# Patient Record
Sex: MALE | Race: WHITE | Employment: FULL TIME | URBAN - METROPOLITAN AREA
[De-identification: names, ages, dates, MRNs, and addresses within clinical notes are randomized per-mention and may not be internally consistent; named-entity substitution may affect disease eponyms.]

---

## 2018-02-25 ENCOUNTER — HOSPITAL ENCOUNTER (OUTPATIENT)
Age: 63
Setting detail: OBSERVATION
Discharge: HOME | End: 2018-02-26
Attending: EMERGENCY MEDICINE | Admitting: INTERNAL MEDICINE
Payer: COMMERCIAL

## 2018-02-25 ENCOUNTER — APPOINTMENT (OUTPATIENT)
Dept: CT IMAGING | Age: 63
End: 2018-02-25
Attending: EMERGENCY MEDICINE
Payer: COMMERCIAL

## 2018-02-25 DIAGNOSIS — G45.1 HEMISPHERIC CAROTID ARTERY SYNDROME: ICD-10-CM

## 2018-02-25 DIAGNOSIS — R20.0 NUMBNESS: Primary | ICD-10-CM

## 2018-02-25 DIAGNOSIS — R29.90 STROKE-LIKE SYMPTOMS: ICD-10-CM

## 2018-02-25 PROBLEM — G45.9 TIA (TRANSIENT ISCHEMIC ATTACK): Status: ACTIVE | Noted: 2018-02-25

## 2018-02-25 LAB
ALBUMIN SERPL-MCNC: 4.2 G/DL (ref 3.5–5)
ALBUMIN/GLOB SERPL: 1.3 {RATIO} (ref 1.1–2.2)
ALP SERPL-CCNC: 78 U/L (ref 45–117)
ALT SERPL-CCNC: 31 U/L (ref 12–78)
ANION GAP SERPL CALC-SCNC: 7 MMOL/L (ref 5–15)
APTT PPP: 27.6 SEC (ref 22.1–32)
AST SERPL-CCNC: 19 U/L (ref 15–37)
BASOPHILS # BLD: 0 K/UL (ref 0–0.1)
BASOPHILS NFR BLD: 0 % (ref 0–1)
BILIRUB SERPL-MCNC: 0.7 MG/DL (ref 0.2–1)
BUN SERPL-MCNC: 17 MG/DL (ref 6–20)
BUN/CREAT SERPL: 16 (ref 12–20)
CALCIUM SERPL-MCNC: 8.8 MG/DL (ref 8.5–10.1)
CHLORIDE SERPL-SCNC: 105 MMOL/L (ref 97–108)
CO2 SERPL-SCNC: 29 MMOL/L (ref 21–32)
CREAT SERPL-MCNC: 1.08 MG/DL (ref 0.7–1.3)
DIFFERENTIAL METHOD BLD: NORMAL
EOSINOPHIL # BLD: 0.2 K/UL (ref 0–0.4)
EOSINOPHIL NFR BLD: 3 % (ref 0–7)
ERYTHROCYTE [DISTWIDTH] IN BLOOD BY AUTOMATED COUNT: 12.5 % (ref 11.5–14.5)
GLOBULIN SER CALC-MCNC: 3.2 G/DL (ref 2–4)
GLUCOSE BLD STRIP.AUTO-MCNC: 125 MG/DL (ref 65–100)
GLUCOSE SERPL-MCNC: 89 MG/DL (ref 65–100)
HCT VFR BLD AUTO: 42.1 % (ref 36.6–50.3)
HGB BLD-MCNC: 14.5 G/DL (ref 12.1–17)
IMM GRANULOCYTES # BLD: 0 K/UL (ref 0–0.04)
IMM GRANULOCYTES NFR BLD AUTO: 0 % (ref 0–0.5)
INR BLD: 1.2 (ref 0.9–1.2)
INR PPP: 1.1 (ref 0.9–1.1)
LYMPHOCYTES # BLD: 1.3 K/UL (ref 0.8–3.5)
LYMPHOCYTES NFR BLD: 26 % (ref 12–49)
MCH RBC QN AUTO: 31.9 PG (ref 26–34)
MCHC RBC AUTO-ENTMCNC: 34.4 G/DL (ref 30–36.5)
MCV RBC AUTO: 92.7 FL (ref 80–99)
MONOCYTES # BLD: 0.6 K/UL (ref 0–1)
MONOCYTES NFR BLD: 11 % (ref 5–13)
NEUTS SEG # BLD: 3 K/UL (ref 1.8–8)
NEUTS SEG NFR BLD: 59 % (ref 32–75)
NRBC # BLD: 0 K/UL (ref 0–0.01)
NRBC BLD-RTO: 0 PER 100 WBC
PLATELET # BLD AUTO: 162 K/UL (ref 150–400)
PMV BLD AUTO: 9.2 FL (ref 8.9–12.9)
POTASSIUM SERPL-SCNC: 4.2 MMOL/L (ref 3.5–5.1)
PROT SERPL-MCNC: 7.4 G/DL (ref 6.4–8.2)
PROTHROMBIN TIME: 11.2 SEC (ref 9–11.1)
RBC # BLD AUTO: 4.54 M/UL (ref 4.1–5.7)
SERVICE CMNT-IMP: ABNORMAL
SODIUM SERPL-SCNC: 141 MMOL/L (ref 136–145)
THERAPEUTIC RANGE,PTTT: NORMAL SECS (ref 58–77)
WBC # BLD AUTO: 5.1 K/UL (ref 4.1–11.1)

## 2018-02-25 PROCEDURE — 85025 COMPLETE CBC W/AUTO DIFF WBC: CPT | Performed by: EMERGENCY MEDICINE

## 2018-02-25 PROCEDURE — 85730 THROMBOPLASTIN TIME PARTIAL: CPT | Performed by: EMERGENCY MEDICINE

## 2018-02-25 PROCEDURE — 70450 CT HEAD/BRAIN W/O DYE: CPT

## 2018-02-25 PROCEDURE — 96372 THER/PROPH/DIAG INJ SC/IM: CPT

## 2018-02-25 PROCEDURE — 74011250636 HC RX REV CODE- 250/636: Performed by: INTERNAL MEDICINE

## 2018-02-25 PROCEDURE — 85610 PROTHROMBIN TIME: CPT | Performed by: EMERGENCY MEDICINE

## 2018-02-25 PROCEDURE — 93005 ELECTROCARDIOGRAM TRACING: CPT

## 2018-02-25 PROCEDURE — 85610 PROTHROMBIN TIME: CPT

## 2018-02-25 PROCEDURE — 99218 HC RM OBSERVATION: CPT

## 2018-02-25 PROCEDURE — 80053 COMPREHEN METABOLIC PANEL: CPT | Performed by: EMERGENCY MEDICINE

## 2018-02-25 PROCEDURE — 36415 COLL VENOUS BLD VENIPUNCTURE: CPT | Performed by: EMERGENCY MEDICINE

## 2018-02-25 PROCEDURE — 99285 EMERGENCY DEPT VISIT HI MDM: CPT

## 2018-02-25 PROCEDURE — 74011250637 HC RX REV CODE- 250/637: Performed by: INTERNAL MEDICINE

## 2018-02-25 PROCEDURE — 82962 GLUCOSE BLOOD TEST: CPT

## 2018-02-25 RX ORDER — SERTRALINE HYDROCHLORIDE 50 MG/1
150 TABLET, FILM COATED ORAL DAILY
Status: DISCONTINUED | OUTPATIENT
Start: 2018-02-26 | End: 2018-02-26 | Stop reason: HOSPADM

## 2018-02-25 RX ORDER — ENOXAPARIN SODIUM 100 MG/ML
40 INJECTION SUBCUTANEOUS EVERY 24 HOURS
Status: DISCONTINUED | OUTPATIENT
Start: 2018-02-25 | End: 2018-02-26 | Stop reason: HOSPADM

## 2018-02-25 RX ORDER — ACETAMINOPHEN 325 MG/1
650 TABLET ORAL
Status: DISCONTINUED | OUTPATIENT
Start: 2018-02-25 | End: 2018-02-26 | Stop reason: HOSPADM

## 2018-02-25 RX ORDER — ZOLPIDEM TARTRATE 5 MG/1
5 TABLET ORAL
Status: DISCONTINUED | OUTPATIENT
Start: 2018-02-25 | End: 2018-02-26 | Stop reason: HOSPADM

## 2018-02-25 RX ORDER — GUAIFENESIN 100 MG/5ML
81 LIQUID (ML) ORAL DAILY
Status: DISCONTINUED | OUTPATIENT
Start: 2018-02-25 | End: 2018-02-26 | Stop reason: HOSPADM

## 2018-02-25 RX ORDER — ONDANSETRON 2 MG/ML
4 INJECTION INTRAMUSCULAR; INTRAVENOUS
Status: DISCONTINUED | OUTPATIENT
Start: 2018-02-25 | End: 2018-02-26 | Stop reason: HOSPADM

## 2018-02-25 RX ORDER — ATORVASTATIN CALCIUM 40 MG/1
40 TABLET, FILM COATED ORAL DAILY
Status: DISCONTINUED | OUTPATIENT
Start: 2018-02-26 | End: 2018-02-26 | Stop reason: HOSPADM

## 2018-02-25 RX ADMIN — ASPIRIN 81 MG 81 MG: 81 TABLET ORAL at 17:14

## 2018-02-25 RX ADMIN — ENOXAPARIN SODIUM 40 MG: 40 INJECTION SUBCUTANEOUS at 17:14

## 2018-02-25 NOTE — ED NOTES
This RN assumed care of patient from triage. Patient reports right sided numbness and dizziness began at 0945 and lasted about 15-20 minutes. Patient reports 2/10 right sided headache. Hand grasps equal, negative drift, clear and logical speech. Patient A&O, speaking in full sentences, and does not appear to be in distress at this time. Patient changed into gown and placed on monitor.

## 2018-02-25 NOTE — ED PROVIDER NOTES
HPI Comments: 65 y.o. male with past medical history significant for HTN, hypercholesterolemia, depression, and cancer who presents accompanied by his wife with chief complaint of numbness. The pt c/o a sudden onset of R arm numbness at 9:45 AM (approximately 1 hour ago). The pt reports that he also developed R leg discomfort that was different from his R arm numbness and a mild R sided HA. The pt also notes lightheadedness and dizziness. The pt denies previous episodes of similar symptoms. The pt denies weakness, decreased ROM, slurred speech, and difficulty ambulating. There are no other acute medical concerns at this time. Social hx: nonsmoker  PCP: Brian Arce MD    Note written by Uriel Zurita, as dictated by Rosana Collier MD 10:48 AM     The history is provided by the patient. No  was used. Past Medical History:   Diagnosis Date    Cancer ([facility] [address]     bcc    Depression 10/19/2011    HTN (hypertension) 10/19/2011    Pure hypercholesterolemia 2015       Past Surgical History:   Procedure Laterality Date    TOTAL HIP ARTHROPLASTY      right          Family History:   Problem Relation Age of Onset    Cancer Sister      lymphoma  36       Social History     Social History    Marital status:      Spouse name: N/A    Number of children: N/A    Years of education: N/A     Occupational History    Not on file. Social History Main Topics    Smoking status: Never Smoker    Smokeless tobacco: Former User    Alcohol use Yes    Drug use: Not on file    Sexual activity: Not on file     Other Topics Concern    Not on file     Social History Narrative         ALLERGIES: Review of patient's allergies indicates no known allergies. Review of Systems   Constitutional: Negative for chills and fever. HENT: Negative for rhinorrhea and sore throat. Respiratory: Negative for cough and shortness of breath. Cardiovascular: Negative for chest pain. Gastrointestinal: Negative for abdominal pain, diarrhea, nausea and vomiting. Genitourinary: Negative for dysuria and hematuria. Musculoskeletal: Positive for myalgias (R leg). Negative for arthralgias and gait problem. Skin: Negative for pallor and rash. Neurological: Positive for dizziness, light-headedness, numbness (R arm) and headaches (R sided). Negative for speech difficulty and weakness. Vitals:    02/25/18 1045 02/25/18 1117   BP: 134/87 135/84   Pulse: 67 62   Resp: 16 15   Temp: 98.2 °F (36.8 °C)    SpO2: 99%    Weight: 82.1 kg (181 lb)    Height: 5' 10\" (1.778 m)             Physical Exam   Vital signs reviewed. Nursing notes reviewed. Const:  No acute distress, well developed, well nourished  Head:  Atraumatic, normocephalic  Eyes:  PERRL, conjunctiva normal, no scleral icterus  Neck:  Supple, trachea midline  Cardiovascular:  RRR, no murmurs, no gallops, no rubs  Resp:  No resp distress, no increased work of breathing, no wheezes, no rhonchi, no rales,  Abd:  Soft, non-tender, non-distended, no rebound, no guarding, no CVA tenderness  :  Deferred  MSK:  No pedal edema, normal ROM  Neuro:  Alert and oriented x3, no cranial nerve defect. Equal BUE and BLE strength. Negative pronator drift. Normal finger to nose testing bilaterally. Skin:  Warm, dry, intact  Psych: normal mood and affect, behavior is normal, judgement and thought content is normal   Note written by Uriel Aguero, as dictated by Brayan Krishna MD 10:48 AM    MDM  Number of Diagnoses or Management Options  Numbness:   Stroke-like symptoms:      Amount and/or Complexity of Data Reviewed  Clinical lab tests: ordered and reviewed  Tests in the radiology section of CPT®: ordered and reviewed  Review and summarize past medical records: yes    Patient Progress  Patient progress: stable      ED Course     Pt. Presents to the ER with numbness. Pt. Is well appearing in the ER. Pt. Is is no distress in the ER.   No mass or bleed on head CT. Concern for TIA/stroke. Pt. To be evaluated for admission by the PCP. Procedures           CONSULT NOTE:  10:56 AM Elías Monroy MD spoke with Dr. Yanet Acuna, Consult for Tele-neurology. Discussed available diagnostic tests and clinical findings. Dr. Tiffany Brooks will see the pt. CONSULT NOTE:  11:05 AM Elías Monroy MD spoke with Dr. Yanet Acuna, Consult for Tele-neurology. Discussed available diagnostic tests and clinical findings. Dr. Tiffany Brooks recommends admittance for a TIA/stroke work up. CONSULT NOTE:  2:01 PM Elías Monroy MD spoke with Dr. Alison Carty, Consult for family practice. Discussed available diagnostic tests and clinical findings. He is in agreement with care plans as outlined. Dr. Alison Carty will see and admit pt for further evaluation of treatment. EKG interpretation: (Preliminary)  Rhythm: sinus bradycardia; and regular . Rate (approx.): 58; Axis: normal; P wave: normal; QRS interval: normal ; ST/T wave: normal; Other findings: borderline ekg.

## 2018-02-25 NOTE — PROGRESS NOTES
Admission Medication Reconciliation:    Information obtained from:  Patient, JOHANNA    Comments/Recommendations: Updated PTA meds/reviewed patient's allergies. Verified med dose using [id number]. Patient reported last administered doses were yesterday. Significant PMH/Disease States:   Past Medical History:   Diagnosis Date    Cancer ([facility] [address]     bcc    Depression 10/19/2011    HTN (hypertension) 10/19/2011    Pure hypercholesterolemia 1/29/2015       Chief Complaint for this Admission:    Chief Complaint   Patient presents with    Numbness    Dizziness       Allergies:  Review of patient's allergies indicates no known allergies. Prior to Admission Medications:   Prior to Admission Medications   Prescriptions Last Dose Informant Patient Reported? Taking?   atorvastatin (LIPITOR) 40 mg tablet 2/24/2018 at Unknown time  No Yes   Sig: TAKE 1 TABLET BY MOUTH DAILY. lisinopril-hydroCHLOROthiazide (PRINZIDE, ZESTORETIC) 20-12.5 mg per tablet 2/24/2018 at Unknown time  No Yes   Sig: TAKE 1 TABLET BY MOUTH DAILY. sertraline (ZOLOFT) 100 mg tablet 2/24/2018 at Unknown time  No Yes   Sig: TAKE 1 AND 1/2 TABLETS BY MOUTH EVERY MORNING.       Facility-Administered Medications: None

## 2018-02-25 NOTE — H&P
History and Physical    Subjective:     Tasia Serrano is a 65 y.o. white male with HTN & hyperlipidemia. He presents to the ER with R-sided numbness, involving his RUE & LUE. No other neurologic sx's. No CP/SOB. No f/c. No n/v. His sx's are essentially resolved now. He is otherwise healthy, and he is on Lipitor & Prinzide as below. In the ER, head CT is normal, and labs unremarkable. Pt is being placed on observation for suspected TIA. Past Medical History:   Diagnosis Date    Cancer ([facility] [address]     bcc    Depression 10/19/2011    HTN (hypertension) 10/19/2011    Pure hypercholesterolemia 2015     No Known Allergies  Prior to Admission medications    Medication Sig Start Date End Date Taking? Authorizing Provider   sertraline (ZOLOFT) 100 mg tablet TAKE 1 AND 1/2 TABLETS BY MOUTH EVERY MORNING. 17   LUDWIG Robb MD   atorvastatin (LIPITOR) 40 mg tablet TAKE 1 TABLET BY MOUTH DAILY. 17   Dominick Dance, MD   lisinopril-hydroCHLOROthiazide (PRINZIDE, ZESTORETIC) 20-12.5 mg per tablet TAKE 1 TABLET BY MOUTH DAILY. 3/15/17   Dominick Dance, MD     Social History   Substance Use Topics    Smoking status: Never Smoker    Smokeless tobacco: Former User    Alcohol use Yes     Family History   Problem Relation Age of Onset    Cancer Sister      lymphoma  36               Review of Systems:  As above. Objective:       Physical Exam:   In NAD. HEENT -- Cranial nerves intact, non-focal.  Neck -- Supple. No JVD. Heart -- RRR. No R/M/G. Lungs -- CTA. Abdomen -- Soft. Non-tender. Non-distended. No masses. Bowel sounds present. Extremeties -- No edema. Neuro -- Non-focal.  No gross neurologic deficits in terms of strength or sensation to light touch.         Data Review:   Recent Results (from the past 24 hour(s))   GLUCOSE, POC    Collection Time: 18 10:57 AM   Result Value Ref Range    Glucose (POC) 125 (H) 65 - 100 mg/dL    Performed by [employer]    POC INR    Collection Time: 02/25/18 10:59 AM   Result Value Ref Range    INR (POC) 1.2 (H) <1.2     CBC WITH AUTOMATED DIFF    Collection Time: 02/25/18 11:13 AM   Result Value Ref Range    WBC 5.1 4.1 - 11.1 K/uL    RBC 4.54 4.10 - 5.70 M/uL    HGB 14.5 12.1 - 17.0 g/dL    HCT 42.1 36.6 - 50.3 %    MCV 92.7 80.0 - 99.0 FL    MCH 31.9 26.0 - 34.0 PG    MCHC 34.4 30.0 - 36.5 g/dL    RDW 12.5 11.5 - 14.5 %    PLATELET 683 485 - 445 K/uL    MPV 9.2 8.9 - 12.9 FL    NRBC 0.0 0  WBC    ABSOLUTE NRBC 0.00 0.00 - 0.01 K/uL    NEUTROPHILS 59 32 - 75 %    LYMPHOCYTES 26 12 - 49 %    MONOCYTES 11 5 - 13 %    EOSINOPHILS 3 0 - 7 %    BASOPHILS 0 0 - 1 %    IMMATURE GRANULOCYTES 0 0.0 - 0.5 %    ABS. NEUTROPHILS 3.0 1.8 - 8.0 K/UL    ABS. LYMPHOCYTES 1.3 0.8 - 3.5 K/UL    ABS. MONOCYTES 0.6 0.0 - 1.0 K/UL    ABS. EOSINOPHILS 0.2 0.0 - 0.4 K/UL    ABS. BASOPHILS 0.0 0.0 - 0.1 K/UL    ABS. IMM. GRANS. 0.0 0.00 - 0.04 K/UL    DF AUTOMATED     METABOLIC PANEL, COMPREHENSIVE    Collection Time: 02/25/18 11:13 AM   Result Value Ref Range    Sodium 141 136 - 145 mmol/L    Potassium 4.2 3.5 - 5.1 mmol/L    Chloride 105 97 - 108 mmol/L    CO2 29 21 - 32 mmol/L    Anion gap 7 5 - 15 mmol/L    Glucose 89 65 - 100 mg/dL    BUN 17 6 - 20 MG/DL    Creatinine 1.08 0.70 - 1.30 MG/DL    BUN/Creatinine ratio 16 12 - 20      GFR est AA >60 >60 ml/min/1.73m2    GFR est non-AA >60 >60 ml/min/1.73m2    Calcium 8.8 8.5 - 10.1 MG/DL    Bilirubin, total 0.7 0.2 - 1.0 MG/DL    ALT (SGPT) 31 12 - 78 U/L    AST (SGOT) 19 15 - 37 U/L    Alk.  phosphatase 78 45 - 117 U/L    Protein, total 7.4 6.4 - 8.2 g/dL    Albumin 4.2 3.5 - 5.0 g/dL    Globulin 3.2 2.0 - 4.0 g/dL    A-G Ratio 1.3 1.1 - 2.2     PROTHROMBIN TIME + INR    Collection Time: 02/25/18 11:13 AM   Result Value Ref Range    INR 1.1 0.9 - 1.1      Prothrombin time 11.2 (H) 9.0 - 11.1 sec   PTT    Collection Time: 02/25/18 11:13 AM   Result Value Ref Range    aPTT 27.6 22.1 - 32.0 sec    aPTT, therapeutic range 58.0 - 77.0 SECS   EKG, 12 LEAD, INITIAL    Collection Time: 02/25/18 11:13 AM   Result Value Ref Range    Ventricular Rate 58 BPM    Atrial Rate 58 BPM    P-R Interval 214 ms    QRS Duration 84 ms    Q-T Interval 430 ms    QTC Calculation (Bezet) 422 ms    Calculated P Axis 50 degrees    Calculated R Axis 12 degrees    Calculated T Axis 33 degrees    Diagnosis Sinus bradycardia with 1st degree AV block            Assessment:     Principal Problem:    TIA (transient ischemic attack) (2/25/2018)        Active Problems:    HTN (hypertension) (10/19/2011)      Pure hypercholesterolemia (1/29/2015)        Plan:     1. Observation tonight. 2.  Brain MRI & carotid dopplers. 3.  Start ASA. 4.  Continue home medications as before. 5.  Lovenox for DVT prophylaxis.     6.  Neuro to f/u -- Can see in am.          Signed By: Zoltan Eid MD     February 25, 2018

## 2018-02-25 NOTE — IP AVS SNAPSHOT
[address] 
[phone] Patient: Baljinder Mcgill. MRN: [id number] [id number] About your hospitalization You were admitted on:  February 25, 2018 You last received care in the:  [facility] 6S NEURO-SCI TELE You were discharged on:  February 26, 2018 Why you were hospitalized Your primary diagnosis was:  Hemispheric Carotid Artery Syndrome Your diagnoses also included:  Htn (Hypertension), Pure Hypercholesterolemia Follow-up Information Follow up With Details Comments Contact Info Veronica Sandifer, [address] [address] 
[phone] Discharge Orders None A check alan indicates which time of day the medication should be taken. My Medications START taking these medications Instructions Each Dose to Equal  
 Morning Noon Evening Bedtime  
 aspirin 81 mg chewable tablet Start taking on:  2/27/2018 Your last dose was: Your next dose is: Take 1 Tab by mouth daily. 81 mg CONTINUE taking these medications Instructions Each Dose to Equal  
 Morning Noon Evening Bedtime  
 atorvastatin 40 mg tablet Commonly known as:  LIPITOR Your last dose was: Your next dose is: TAKE 1 TABLET BY MOUTH DAILY. lisinopril-hydroCHLOROthiazide 20-12.5 mg per tablet Commonly known as:  Mary Stef Your last dose was: Your next dose is: TAKE 1 TABLET BY MOUTH DAILY. sertraline 100 mg tablet Commonly known as:  ZOLOFT Your last dose was: Your next dose is: TAKE 1 AND 1/2 TABLETS BY MOUTH EVERY MORNING. Where to Get Your Medications Information on where to get these meds will be given to you by the nurse or doctor. ! Ask your nurse or doctor about these medications  
  aspirin 81 mg chewable tablet Discharge Instructions Patient Discharge Instructions [facility] / [id number] : 1955 Admitted 2018 Discharged: 2018 · It is important that you take the medication exactly as they are prescribed. · Keep your medication in the bottles provided by the pharmacist and keep a list of the medication names, dosages, and times to be taken in your wallet. Recommended diet: prudent cardiac Recommended activity: no restriction Follow-up with  Friday Information obtained by : 
I understand that if any problems occur once I am at home I am to contact my physician. I understand and acknowledge receipt of the instructions indicated above. Physician's or R.N.'s Signature                                                                  Date/Time Patient or Representative Signature                                                          Date/Time Introducing [facility]! Dear Bevin Osler: 
Thank you for requesting a [employer] account. Our records indicate that you already have an active [employer] account. You can access your account anytime at [email or web address] Did you know that you can access your hospital and ER discharge instructions at any time in [employer]? You can also review all of your test results from your hospital stay or ER visit. Additional Information If you have questions, please visit the Frequently Asked Questions section of the [employer] website at [email or web address] Remember, MyChart is NOT to be used for urgent needs. For medical emergencies, dial 911. Now available from your iPhone and Android! Unresulted Labs-Please follow up with your PCP about these lab tests Order Current Status DUPLEX CAROTID BILATERAL Preliminary result Providers Seen During Your Hospitalization Provider Specialty Primary office phone Lila Haley MD Emergency Medicine [phone] Myron Warner MD Emergency Medicine [phone] Lisette Wiley MD Internal Medicine [phone] Immunizations Administered for This Admission Name Date Influenza Vaccine (Quad) PF  Deferred () Your Primary Care Physician (PCP) Primary Care Physician Office Phone Office Fax Dago Diaz [phone] You are allergic to the following No active allergies Recent Documentation Height Weight BMI Smoking Status 1.778 m 81 kg 25.62 kg/m2 Never Smoker Emergency Contacts Name Discharge Info Relation Home Work Mobile Nidia Marrero DISCHARGE CAREGIVER [3] Spouse [3] [phone] Patient Belongings The following personal items are in your possession at time of discharge: 
  Dental Appliances: None  Visual Aid: Glasses, With patient      Home Medications: None   Jewelry: Watch  Clothing: Belt, Pants, Undergarments, Jacket/Coat, Footwear    Other Valuables: Eyeglasses, [employer], Money (comment), Erik Farrell Please provide this summary of care documentation to your next provider. Signatures-by signing, you are acknowledging that this After Visit Summary has been reviewed with you and you have received a copy. Patient Signature:  ____________________________________________________________ Date:  ____________________________________________________________  
  
Nissa Lafleur Provider Signature:  ____________________________________________________________ Date:  ____________________________________________________________

## 2018-02-25 NOTE — IP AVS SNAPSHOT
[address] 
[phone] Patient: Jian Hameed. MRN: [id number] [id number] A check alan indicates which time of day the medication should be taken. My Medications START taking these medications Instructions Each Dose to Equal  
 Morning Noon Evening Bedtime  
 aspirin 81 mg chewable tablet Start taking on:  2/27/2018 Your last dose was: Your next dose is: Take 1 Tab by mouth daily. 81 mg CONTINUE taking these medications Instructions Each Dose to Equal  
 Morning Noon Evening Bedtime  
 atorvastatin 40 mg tablet Commonly known as:  LIPITOR Your last dose was: Your next dose is: TAKE 1 TABLET BY MOUTH DAILY. lisinopril-hydroCHLOROthiazide 20-12.5 mg per tablet Commonly known as:  Saad Haley Your last dose was: Your next dose is: TAKE 1 TABLET BY MOUTH DAILY. sertraline 100 mg tablet Commonly known as:  ZOLOFT Your last dose was: Your next dose is: TAKE 1 AND 1/2 TABLETS BY MOUTH EVERY MORNING. Where to Get Your Medications Information on where to get these meds will be given to you by the nurse or doctor. ! Ask your nurse or doctor about these medications  
  aspirin 81 mg chewable tablet

## 2018-02-25 NOTE — PROGRESS NOTES
Primary Nurse nAju Robles and Plaomo Lozano RN performed a dual skin assessment on this patient No impairment noted  Brando score is 23    Some scars from basal cell removal on back and leg.

## 2018-02-25 NOTE — PROGRESS NOTES
TRANSFER - IN REPORT:    Verbal report received from Cecilia Saldivar RN(name) on [facility].  being received from ED(unit) for routine progression of care      Report consisted of patients Situation, Background, Assessment and   Recommendations(SBAR). Information from the following report(s) SBAR, Kardex, ED Summary, Intake/Output, MAR, Accordion and Cardiac Rhythm NSR/SB was reviewed with the receiving nurse. Opportunity for questions and clarification was provided. Assessment completed upon patients arrival to unit and care assumed.

## 2018-02-26 ENCOUNTER — APPOINTMENT (OUTPATIENT)
Dept: MRI IMAGING | Age: 63
End: 2018-02-26
Attending: PSYCHIATRY & NEUROLOGY
Payer: COMMERCIAL

## 2018-02-26 VITALS
OXYGEN SATURATION: 97 % | TEMPERATURE: 98 F | SYSTOLIC BLOOD PRESSURE: 125 MMHG | HEART RATE: 60 BPM | WEIGHT: 178.57 LBS | DIASTOLIC BLOOD PRESSURE: 82 MMHG | RESPIRATION RATE: 20 BRPM | BODY MASS INDEX: 25.56 KG/M2 | HEIGHT: 70 IN

## 2018-02-26 PROBLEM — G45.1 HEMISPHERIC CAROTID ARTERY SYNDROME: Status: ACTIVE | Noted: 2018-02-25

## 2018-02-26 LAB
ATRIAL RATE: 58 BPM
CALCULATED P AXIS, ECG09: 50 DEGREES
CALCULATED R AXIS, ECG10: 12 DEGREES
CALCULATED T AXIS, ECG11: 33 DEGREES
CHOLEST SERPL-MCNC: 149 MG/DL
DIAGNOSIS, 93000: NORMAL
HDLC SERPL-MCNC: 55 MG/DL
HDLC SERPL: 2.7 {RATIO} (ref 0–5)
LDLC SERPL CALC-MCNC: 73.4 MG/DL (ref 0–100)
LIPID PROFILE,FLP: NORMAL
P-R INTERVAL, ECG05: 214 MS
Q-T INTERVAL, ECG07: 430 MS
QRS DURATION, ECG06: 84 MS
QTC CALCULATION (BEZET), ECG08: 422 MS
TRIGL SERPL-MCNC: 103 MG/DL (ref ?–150)
VENTRICULAR RATE, ECG03: 58 BPM
VLDLC SERPL CALC-MCNC: 20.6 MG/DL

## 2018-02-26 PROCEDURE — 74011250637 HC RX REV CODE- 250/637: Performed by: INTERNAL MEDICINE

## 2018-02-26 PROCEDURE — 36415 COLL VENOUS BLD VENIPUNCTURE: CPT | Performed by: INTERNAL MEDICINE

## 2018-02-26 PROCEDURE — 93880 EXTRACRANIAL BILAT STUDY: CPT

## 2018-02-26 PROCEDURE — 99218 HC RM OBSERVATION: CPT

## 2018-02-26 PROCEDURE — 93306 TTE W/DOPPLER COMPLETE: CPT

## 2018-02-26 PROCEDURE — 70544 MR ANGIOGRAPHY HEAD W/O DYE: CPT

## 2018-02-26 PROCEDURE — 70551 MRI BRAIN STEM W/O DYE: CPT

## 2018-02-26 PROCEDURE — 80061 LIPID PANEL: CPT | Performed by: INTERNAL MEDICINE

## 2018-02-26 RX ORDER — GUAIFENESIN 100 MG/5ML
81 LIQUID (ML) ORAL DAILY
Qty: 1 TAB | Refills: 1 | Status: SHIPPED
Start: 2018-02-27 | End: 2020-06-10 | Stop reason: SDDI

## 2018-02-26 RX ADMIN — ASPIRIN 81 MG 81 MG: 81 TABLET ORAL at 08:05

## 2018-02-26 RX ADMIN — SERTRALINE HYDROCHLORIDE 150 MG: 50 TABLET ORAL at 08:05

## 2018-02-26 RX ADMIN — ATORVASTATIN CALCIUM 40 MG: 40 TABLET, FILM COATED ORAL at 08:06

## 2018-02-26 RX ADMIN — HYDROCHLOROTHIAZIDE: 25 TABLET ORAL at 08:05

## 2018-02-26 NOTE — DISCHARGE INSTRUCTIONS
Patient Discharge Instructions    Delvin Cherie / [id number] : 1955    Admitted 2018 Discharged: 2018                 · It is important that you take the medication exactly as they are prescribed. · Keep your medication in the bottles provided by the pharmacist and keep a list of the medication names, dosages, and times to be taken in your wallet. Recommended diet: prudent cardiac      Recommended activity: no restriction      Follow-up with  Friday        Information obtained by :  I understand that if any problems occur once I am at home I am to contact my physician. I understand and acknowledge receipt of the instructions indicated above.                                                                                                                                            Physician's or R.N.'s Signature                                                                  Date/Time                                                                                                                                              Patient or Representative Signature                                                          Date/Time

## 2018-02-26 NOTE — PROGRESS NOTES
Problem: Falls - Risk of  Goal: *Absence of Falls  Document Yaneli Fall Risk and appropriate interventions in the flowsheet.    Outcome: Progressing Towards Goal  Fall Risk Interventions:            Medication Interventions: Assess postural VS orthostatic hypotension, Teach patient to arise slowly

## 2018-02-26 NOTE — PROGRESS NOTES
General Daily Progress Note    Plan:   Carotid duplex, TTE, Brain mri wo contrast, neuro consult  Cont baby asa  Expect discharge later today      Assessment:   Sinus bradycardia without ectopy on tele    Principal Problem:    TIA (transient ischemic attack) (2/25/2018)    Active Problems:    HTN (hypertension) (10/19/2011)      Pure hypercholesterolemia (1/29/2015)          2/26/2018    Admit Date: 2/25/2018    Subjective:     Currently at baseline,some stiffness in shoulders and neck but full cervical extension does not produce tingling  Numbness RUE yesterday am resolved after 3 hours, he reports some brief tingle RLE and dull right sided headache, no change in [place] and no prior hx of headache ( no family migraine)    Objective:     Patient Vitals for the past 8 hrs:   BP Temp Pulse Resp SpO2 Weight   02/26/18 0700 117/71 98.6 °F (37 °C) (!) 50 14 98 % -   02/26/18 0300 103/62 98.5 °F (36.9 °C) (!) 52 14 98 % 178 lb 9.2 oz (81 kg)             Physical Exam:neck supple carotids 2 + no bruit  Lungs -    Heart -sinus berlin no murmur  Abdomen-  Extremities-nl gait finger to nose intact    Data Review   Recent Results (from the past 24 hour(s))   GLUCOSE, POC    Collection Time: 02/25/18 10:57 AM   Result Value Ref Range    Glucose (POC) 125 (H) 65 - 100 mg/dL    Performed by Chidi Rivera    POC INR    Collection Time: 02/25/18 10:59 AM   Result Value Ref Range    INR (POC) 1.2 (H) <1.2     CBC WITH AUTOMATED DIFF    Collection Time: 02/25/18 11:13 AM   Result Value Ref Range    WBC 5.1 4.1 - 11.1 K/uL    RBC 4.54 4.10 - 5.70 M/uL    HGB 14.5 12.1 - 17.0 g/dL    HCT 42.1 36.6 - 50.3 %    MCV 92.7 80.0 - 99.0 FL    MCH 31.9 26.0 - 34.0 PG    MCHC 34.4 30.0 - 36.5 g/dL    RDW 12.5 11.5 - 14.5 %    PLATELET 504 114 - 459 K/uL    MPV 9.2 8.9 - 12.9 FL    NRBC 0.0 0  WBC    ABSOLUTE NRBC 0.00 0.00 - 0.01 K/uL    NEUTROPHILS 59 32 - 75 %    LYMPHOCYTES 26 12 - 49 %    MONOCYTES 11 5 - 13 %    EOSINOPHILS 3 0 - 7 % BASOPHILS 0 0 - 1 %    IMMATURE GRANULOCYTES 0 0.0 - 0.5 %    ABS. NEUTROPHILS 3.0 1.8 - 8.0 K/UL    ABS. LYMPHOCYTES 1.3 0.8 - 3.5 K/UL    ABS. MONOCYTES 0.6 0.0 - 1.0 K/UL    ABS. EOSINOPHILS 0.2 0.0 - 0.4 K/UL    ABS. BASOPHILS 0.0 0.0 - 0.1 K/UL    ABS. IMM. GRANS. 0.0 0.00 - 0.04 K/UL    DF AUTOMATED     METABOLIC PANEL, COMPREHENSIVE    Collection Time: 02/25/18 11:13 AM   Result Value Ref Range    Sodium 141 136 - 145 mmol/L    Potassium 4.2 3.5 - 5.1 mmol/L    Chloride 105 97 - 108 mmol/L    CO2 29 21 - 32 mmol/L    Anion gap 7 5 - 15 mmol/L    Glucose 89 65 - 100 mg/dL    BUN 17 6 - 20 MG/DL    Creatinine 1.08 0.70 - 1.30 MG/DL    BUN/Creatinine ratio 16 12 - 20      GFR est AA >60 >60 ml/min/1.73m2    GFR est non-AA >60 >60 ml/min/1.73m2    Calcium 8.8 8.5 - 10.1 MG/DL    Bilirubin, total 0.7 0.2 - 1.0 MG/DL    ALT (SGPT) 31 12 - 78 U/L    AST (SGOT) 19 15 - 37 U/L    Alk.  phosphatase 78 45 - 117 U/L    Protein, total 7.4 6.4 - 8.2 g/dL    Albumin 4.2 3.5 - 5.0 g/dL    Globulin 3.2 2.0 - 4.0 g/dL    A-G Ratio 1.3 1.1 - 2.2     PROTHROMBIN TIME + INR    Collection Time: 02/25/18 11:13 AM   Result Value Ref Range    INR 1.1 0.9 - 1.1      Prothrombin time 11.2 (H) 9.0 - 11.1 sec   PTT    Collection Time: 02/25/18 11:13 AM   Result Value Ref Range    aPTT 27.6 22.1 - 32.0 sec    aPTT, therapeutic range     58.0 - 77.0 SECS   EKG, 12 LEAD, INITIAL    Collection Time: 02/25/18 11:13 AM   Result Value Ref Range    Ventricular Rate 58 BPM    Atrial Rate 58 BPM    P-R Interval 214 ms    QRS Duration 84 ms    Q-T Interval 430 ms    QTC Calculation (Bezet) 422 ms    Calculated P Axis 50 degrees    Calculated R Axis 12 degrees    Calculated T Axis 33 degrees    Diagnosis       Sinus bradycardia with 1st degree AV block    Confirmed by Yomaira Owusu MD. ([id number]) on 2/26/2018 12:53:33 AM     LIPID PANEL    Collection Time: 02/26/18  3:15 AM   Result Value Ref Range    LIPID PROFILE          Cholesterol, total 149 <200 MG/DL    Triglyceride 103 <150 MG/DL    HDL Cholesterol 55 MG/DL    LDL, calculated 73.4 0 - 100 MG/DL    VLDL, calculated 20.6 MG/DL    CHOL/HDL Ratio 2.7 0 - 5.0         CULTURES:    No results found for: SDES No results found for: Yudi Childes

## 2018-02-26 NOTE — CONSULTS
NEUROLOGY  2018     Consulted by: Olya Hanson MD        Patient ID:  Geovani Montes  [id number]  65 y.o.  1955    Chief Complaint   Patient presents with    Numbness    Dizziness       HPI    Mr. Kamran Stubbs is a 58-year-old woman with hypertension hyperlipidemia who presents with new symptoms. Yesterday morning while reading the newspaper he suddenly felt his right arm become numb and tingly in a nondermatomal distribution. He thinks this lasted for about 30-45 minutes. Concomitantly he felt lightheaded. He thinks there might of been similar symptoms in the right leg but not as severe and not as intense. He did develop a mild right-sided headache at some point afterwards. He feels his baseline currently. He does not take aspirin. He is very physically active taking care of himself. He is on Lipitor daily. No recent illness or trauma. No medication changes. Review of Systems   Neurological: Positive for tingling, sensory change and headaches. All other systems reviewed and are negative. Past Medical History:   Diagnosis Date    Cancer ([facility] [address]     bcc    Depression 10/19/2011    HTN (hypertension) 10/19/2011    Pure hypercholesterolemia 2015     Family History   Problem Relation Age of Onset    Cancer Sister      lymphoma  36     Social History     Social History    Marital status:      Spouse name: N/A    Number of children: N/A    Years of education: N/A     Occupational History    Not on file.      Social History Main Topics    Smoking status: Never Smoker    Smokeless tobacco: Former User    Alcohol use Yes    Drug use: Not on file    Sexual activity: Not on file     Other Topics Concern    Not on file     Social History Narrative     Current Facility-Administered Medications   Medication Dose Route Frequency    atorvastatin (LIPITOR) tablet 40 mg  40 mg Oral DAILY    sertraline (ZOLOFT) tablet 150 mg  150 mg Oral DAILY    aspirin chewable tablet 81 mg  81 mg Oral DAILY    ondansetron (ZOFRAN) injection 4 mg  4 mg IntraVENous Q6H PRN    acetaminophen (TYLENOL) tablet 650 mg  650 mg Oral Q4H PRN    zolpidem (AMBIEN) tablet 5 mg  5 mg Oral QHS PRN    enoxaparin (LOVENOX) injection 40 mg  40 mg SubCUTAneous Q24H    influenza vaccine 2017-18 (3 yrs+)(PF) (FLUZONE QUAD/FLUARIX QUAD) injection 0.5 mL  0.5 mL IntraMUSCular PRIOR TO DISCHARGE    lisinopril/hydroCHLOROthiazide(PRINZIDE/ZESTORETIC) 20/12.5 mg   Oral DAILY     No Known Allergies    Visit Vitals    /82 (BP 1 Location: Right arm, BP Patient Position: At rest)    Pulse 61    Temp 98 °F (36.7 °C)    Resp 20    Ht 5' 10\" (1.778 m)    Wt 81 kg (178 lb 9.2 oz)    SpO2 96%    BMI 25.62 kg/m2     Physical Exam   Constitutional: He appears well-nourished. Cardiovascular: Normal rate. Pulmonary/Chest: Effort normal.   Skin: Skin is warm and dry. Psychiatric: He has a normal mood and affect. His behavior is normal.   Vitals reviewed.     Neurologic Exam         Lab Results  Component Value Date/Time   WBC 5.1 02/25/2018 11:13 AM   WBC (POC) 6.3 05/19/2017 09:07 AM   HGB 14.5 02/25/2018 11:13 AM   HGB (POC) 15.0 05/19/2017 09:07 AM   HCT 42.1 02/25/2018 11:13 AM   HCT (POC) 46.7 05/19/2017 09:07 AM   PLATELET 231 49/74/1951 11:13 AM   PLATELET (POC) 004 86/01/9829 09:07 AM   MCV 92.7 02/25/2018 11:13 AM   MCV (POC) 92.5 05/19/2017 09:07 AM     Lab Results  Component Value Date/Time   Glucose 89 02/25/2018 11:13 AM   Glucose (POC) 125 (H) 02/25/2018 10:57 AM   LDL, calculated 73.4 02/26/2018 03:15 AM   Creatinine 1.08 02/25/2018 11:13 AM      Lab Results  Component Value Date/Time   Cholesterol, total 149 02/26/2018 03:15 AM   Cholesterol (POC) 156 05/19/2017 09:07 AM   HDL Cholesterol 55 02/26/2018 03:15 AM   LDL, calculated 73.4 02/26/2018 03:15 AM   LDL Cholesterol (POC) 92 05/19/2017 09:07 AM   Triglyceride 103 02/26/2018 03:15 AM   Triglycerides (POC) 56 05/19/2017 09:07 AM   CHOL/HDL Ratio 2.7 02/26/2018 03:15 AM     Lab Results  Component Value Date/Time   ALT (SGPT) 31 02/25/2018 11:13 AM   AST (SGOT) 19 02/25/2018 11:13 AM   Alk. phosphatase 78 02/25/2018 11:13 AM   Bilirubin, total 0.7 02/25/2018 11:13 AM   Albumin 4.2 02/25/2018 11:13 AM   Protein, total 7.4 02/25/2018 11:13 AM   INR 1.1 02/25/2018 11:13 AM   Prothrombin time 11.2 (H) 02/25/2018 11:13 AM   PLATELET 859 37/12/2134 11:13 AM   PLATELET (POC) 668 00/67/4548 09:07 AM          CT Results (maximum last 3): Results from [place] encounter on 02/25/18   CT CODE NEURO HEAD WO CONTRAST   Narrative EXAM:  CT CODE NEURO HEAD WO CONTRAST    INDICATION:   Right-sided weakness and numbness, dizziness this morning    COMPARISON: None. CONTRAST:  None. TECHNIQUE: Unenhanced CT of the head was performed using 5 mm images. Brain and  bone windows were generated. CT dose reduction was achieved through use of a  standardized protocol tailored for this examination and automatic exposure  control for dose modulation. FINDINGS:  The ventricles and sulci are normal in size, shape and configuration and  midline. There is no significant white matter disease. There is no intracranial  hemorrhage, extra-axial collection, mass, mass effect or midline shift. The  basilar cisterns are open. No acute infarct is identified. The bone windows  demonstrate no abnormalities. The visualized portions of the paranasal sinuses  and mastoid air cells are clear. Vascular calcification is noted. Impression IMPRESSION: No acute abnormality. Results from Abstract encounter on 12/27/11   CT CARDIAC SCORING LIMITED    MRI Results (maximum last 3): No results found for this or any previous visit. VAS/US/Carotid Doppler Results (maximum last 3): No results found for this or any previous visit. PET Results (maximum last 3): No results found for this or any previous visit.         Assessment and Plan      65year-old gentleman whom I suspect may have had a TIA. MRI and MRA have been ordered. Echo and carotid ultrasound to be done as well. Start aspirin 81 mg daily and continue on. Continue Lipitor. Recommend telemetry for at least 24 hours. If the above studies are benign I anticipate he can be discharged. If imaging is abnormal I will be following up. Please call with any questions. During this evaluation, we also discussed stroke education to include signs and symptoms of stroke and TIA.        [address] [facility],   NEUROLOGIST  Diplomate ABPN  2/26/2018

## 2018-02-26 NOTE — PROCEDURES
[facility]  *** FINAL REPORT ***    Name: Kristan Babcock  MRN: [id number]    Outpatient  : 1955  HIS Order #: [id number]  [address] Visit #: [id number]  Date: 2018    TYPE OF TEST: Cerebrovascular Duplex    REASON FOR TEST  TIA    Right Carotid:-             Proximal               Mid                 Distal  cm/s  Systolic  Diastolic  Systolic  Diastolic  Systolic  Diastolic  CCA:     42.2                                      87.0  Bulb:  ECA:     93.0  ICA:     48.0      19.0                            64.0      26.0  ICA/CCA:  0.6    ICA Stenosis:    Right Vertebral:-  Finding: Antegrade  Sys:       42.0  Viviana:       13.0    Right Subclavian:    Left Carotid:-            Proximal                Mid                 Distal  cm/s  Systolic  Diastolic  Systolic  Diastolic  Systolic  Diastolic  CCA:     83.2                                     102.0  Bulb:  ECA:     72.0  ICA:     84.0      20.0                            69.0      30.0  ICA/CCA:  0.8    ICA Stenosis:    Left Vertebral:-  Finding: Antegrade  Sys:       41.0  Viviana:       13.0    Left Subclavian:    INTERPRETATION/FINDINGS  PROCEDURE:  Color duplex ultrasound imaging of extracranial  cerebrovascular arteries. FINDINGS:       Right:  Internal carotid velocity is within normal limits, there  is no observed narrowing of the flow channel on color Doppler imaging,   and no plaque is visualized on B-mode imaging. The common and  external carotid arteries are patent and without evidence of stenosis. Left:   Internal carotid velocity is within normal limits, there  is no observed narrowing of the flow channel on color Doppler imaging,   and no plaque is visualized on B-mode imaging. The common and  external carotid arteries are patent and without evidence of stenosis. IMPRESSION:  No evidence of carotid artery stenosis. Vertebrals are  patent with antegrade flow.     ADDITIONAL COMMENTS    I have personally reviewed the data relevant to the interpretation of  this  study.     TECHNOLOGIST: Kendall Byrne RVT  Signed: 02/26/2018 02:44 PM    PHYSICIAN: Reji Crawford MD  Signed: 02/27/2018 11:09 AM

## 2018-02-26 NOTE — ROUTINE PROCESS
Bedside and Verbal shift change report given to [address] (oncoming nurse) by Charlotte Yuan (offgoing nurse). Report included the following information SBAR, Kardex, ED Summary, Procedure Summary, Intake/Output, MAR, Accordion, Med Rec Status and Cardiac Rhythm NSR/Sinus Ray Jones.

## 2018-02-26 NOTE — PROGRESS NOTES
Stroke Education documented in Patient Education: YES  Core Measures Documented in [facility]:  Risk Factors: YES  Warning signs of stroke: YES  When to Activate 911: YES  Medication Education for Risk Factors: YES  Smoking cessation if applicable: N/A  Written Education Given:  YES    Discharge NIH Completed: YES  Score: 0    BRAINS: YES    Follow Up Appointment Made: NO  Date/Time if applicable: IMANI/Ben RN performed patient education and medication education. Discharge concerns initiated and discussed with patient, including clarification on \"who\" assists the patient at their home and instructions for when the home going patient should call their provider after discharge. Opportunity for questions and clarification was provided. Patient receptive to education: YES  Patient stated: I can just get over the counter Aspirin  Barriers to Education: None  Diagnosis Education given:  YES    Length of stay: 0  Expected Day of Discharge: 2/26/2018  Ask if they have \"Help at Home\" & add to white board?   YES    Education Day #: 2    Medication Education Given:  YES  M in the box Medication name: Aspirin    Pt aware of HCAHPS survey: YES

## 2018-02-26 NOTE — DISCHARGE SUMMARY
[address] [facility]     Physician Discharge Summary     Patient ID:  Delvin Crespo  [id number]  65 y.o.  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 He remained in RSR with nl TTE, carotid duplex and MRI/A brain. NO historyy of migraine. . Seen by neurology possible TIA. Sent home with baby asa. Code status full  Disposition:home with office f/u 4 days    CONDITION - IMPROVED    Follow up as per in patient DC instructions    Patient Instructions:   Current Discharge Medication List      START taking these medications    Details   aspirin 81 mg chewable tablet Take 1 Tab by mouth daily. Qty: 1 Tab, Refills: 1         CONTINUE these medications which have NOT CHANGED    Details   sertraline (ZOLOFT) 100 mg tablet TAKE 1 AND 1/2 TABLETS BY MOUTH EVERY MORNING. Qty: 135 Tab, Refills: 4      atorvastatin (LIPITOR) 40 mg tablet TAKE 1 TABLET BY MOUTH DAILY. Qty: 90 Tab, Refills: 4      lisinopril-hydroCHLOROthiazide (PRINZIDE, ZESTORETIC) 20-12.5 mg per tablet TAKE 1 TABLET BY MOUTH DAILY.   Qty: 90 Tab, Refills: 4               Signed:  Eliz Doan MD  2/26/2018  5:17 PM

## 2018-02-26 NOTE — ROUTINE PROCESS
Bedside shift change report given to Rolinda Cabot (oncoming nurse) by [facility] MADAI DE LOS SANTOS RN (offgoing nurse). Report included the following information SBAR, Kardex, ED Summary, Procedure Summary, Intake/Output, MAR, Accordion, Recent Results and Cardiac Rhythm NSR.

## 2018-02-26 NOTE — PROGRESS NOTES
Reason for Admission: The patient presented to the ED secondary to experiencing right-sided numbness  RRAT Score: 6  Plan for utilizing home health: Patient currently does not have PT/OT orders- CM will continue to follow if PT/OT is needed. Likelihood of Readmission: Low    The CM met with the patient at bedside in order to introduce the role of CM and assess for needs. The CM lives with his wife in their own home. The patient utilizes [facility], and endorses having transportation support upon discharge. Patient denied having difficulty affording or accessing medication. The patient denied having any needs or concerns at this time. CM will continue to follow as discharge needs arise. PAM Fernandez    Care Management Interventions  PCP Verified by CM:  Yes (Patient verified PCP as Dr. Sky Escalona)  Mode of Transport at Discharge: Self (Patient endorsed having family provide transportation upon discharge)  Transition of Care Consult (CM Consult): Discharge Planning  MyChart Signup: Yes  Discharge Durable Medical Equipment: No  Health Maintenance Reviewed: Yes  Physical Therapy Consult: No  Occupational Therapy Consult: No  Speech Therapy Consult: No  Current Support Network: Own Home, Lives with Spouse (Patient lives with his spouse in own home)  Confirm Follow Up Transport: Family (Patient endorsed having transportation assistance upon discharge)  Plan discussed with Pt/Family/Caregiver: Yes  [place] Resource Information Provided?: No  Discharge Location  Discharge Placement: Home

## 2018-02-26 NOTE — INTERDISCIPLINARY ROUNDS
IDR/SLIDR Summary          Patient: Jian Quiroga MRN: [id number]    Age: 65 y.o. YOB: 1955 Room/Bed: [facility]   Admit Diagnosis: TIA (transient ischemic attack)  Principal Diagnosis: TIA (transient ischemic attack)   Goals: TIA work up/testing, D/C Planning  Readmission: NO  Quality Measure: Not applicable  VTE Prophylaxis: Chemical  Influenza Vaccine screening completed? YES  Pneumococcal Vaccine screening completed? NO  Mobility needs: No   Nutrition plan:Yes  Consults:P.T, O.T. and Case Management    Financial concerns:No  Escalated to CM? YES  RRAT Score: 0   Interventions:  Testing due for pt today?  NO  LOS: 0 days Expected length of stay 1-2 days  Discharge plan: Home   PCP: Danna Contreras MD  Transportation needs: No    Days before discharge:one day until discharge   Discharge disposition: Home    Signed:     Hilton Crowder RN  2/26/18  [id number]

## 2018-02-26 NOTE — PROGRESS NOTES
Problem: TIA/CVA Stroke: 0-24 hours  Goal: Treatments/Interventions/Procedures  Outcome: Progressing Towards Goal  Discussed reasoning for testing patient got today. MRI still pending; Discharge pending based off results of testing.

## 2018-02-26 NOTE — PROGRESS NOTES
Bedside shift change report given to Bernard Plummer RN (oncoming nurse) by Meryle Nutley (offgoing nurse). Report included the following information SBAR, Kardex, MAR and Cardiac Rhythm SB, NSR 1st degree AV block.

## 2018-04-19 ENCOUNTER — HOSPITAL ENCOUNTER (OUTPATIENT)
Dept: LAB | Age: 63
Discharge: HOME | End: 2018-04-19

## 2018-04-19 ENCOUNTER — OFFICE VISIT (OUTPATIENT)
Dept: DERMATOLOGY | Facility: AMBULATORY SURGERY CENTER | Age: 63
End: 2018-04-19

## 2018-04-19 VITALS
RESPIRATION RATE: 16 BRPM | OXYGEN SATURATION: 98 % | DIASTOLIC BLOOD PRESSURE: 76 MMHG | SYSTOLIC BLOOD PRESSURE: 126 MMHG | HEART RATE: 68 BPM | TEMPERATURE: 97.8 F

## 2018-04-19 DIAGNOSIS — L81.4 LENTIGINES: ICD-10-CM

## 2018-04-19 DIAGNOSIS — L82.1 OTHER SEBORRHEIC KERATOSIS: ICD-10-CM

## 2018-04-19 DIAGNOSIS — R23.3 ECCHYMOSES, SPONTANEOUS: ICD-10-CM

## 2018-04-19 DIAGNOSIS — L57.0 ACTINIC KERATOSIS: ICD-10-CM

## 2018-04-19 DIAGNOSIS — D49.2 NEOPLASM OF SKIN OF BACK: Primary | ICD-10-CM

## 2018-04-19 DIAGNOSIS — D18.01 CHERRY ANGIOMA: ICD-10-CM

## 2018-04-19 RX ORDER — ATORVASTATIN CALCIUM 40 MG/1
TABLET, FILM COATED ORAL DAILY
COMMUNITY
End: 2018-11-26

## 2018-04-19 RX ORDER — LISINOPRIL AND HYDROCHLOROTHIAZIDE 12.5; 2 MG/1; MG/1
TABLET ORAL DAILY
COMMUNITY
End: 2018-11-26

## 2018-04-19 RX ORDER — ASPIRIN 81 MG/1
TABLET ORAL DAILY
COMMUNITY
End: 2020-06-10

## 2018-04-19 RX ORDER — SERTRALINE HYDROCHLORIDE 100 MG/1
TABLET, FILM COATED ORAL DAILY
COMMUNITY
End: 2018-11-26

## 2018-04-19 NOTE — PROGRESS NOTES
CC: check skin, history of skin cancer and sun exposure    HPI: Mr. Jian Maynard comes in for an initial visit. He is a 65 yr old man with a history of recreational sun exposure. He has had precancerous spots treated on his face and he believes a skin cancer treated with scraping years ago on his back. He notices itching at the scar on his back. He has 2 new lesions on the right forearm that suddenly appeared. He tries to use sunscreen regularly. His paternal grandmother had melanoma. He is feeling well today, no pain or illnesses. I reviewed his allergies, medications, medical and social history. Exam:  He is a well appearing man in no distress. I performed a full skin exam including scalp, face, ears, neck, chest, back, abdomen, arm and hands, legs and feet, buttocks. There is no cervical adenopathy. He has numerous lentigines in exposed areas. He has scattered cherry angiomas. He has very few nevi and seborrheic keratoses. He has one AK on the right temple. He has a white scar 2 cm in size on the left upper back with an adjacent pink macular area. 2 Lesions on his right forearm are ecchymoses. A/P:  1. Personal and family history of skin cancer. I reviewed sun protection and the warning signs of skin cancer, and I advised exams every 6-12 months for surveillance. He understands. 2. Actinic keratosis. The diagnosis of this precancerous lesion related to sun exposure was reviewed. I treated 1 lesion with cryotherapy after verbal consent was obtained and post-cryotherapy care was reviewed. 3.  Neoplasm left upper back. Scar vs BCC considered. A shave biopsy was advised to address this lesion. The procedure was reviewed and verbal and written consents were obtained. The risks of pain, bleeding, infection, and scar were discussed. I performed the procedure. The site was cleansed and anesthetized with 1% lidocaine with epinephrine 1:100,000.   A shave biopsy was performed to sample the lesion. Drysol was used for hemostasis. The wound was bandaged and care reviewed. The specimen was sent to pathology. I will contact the patient with the results and any further treatment that may be necessary. 4.  Seborrheic keratoses, benign nevi, lentigines. Ecchymoses. Each diagnosis was discussed, no treatment needed.     [facility]  OFFICE PROCEDURE PROGRESS NOTE        Chart reviewed for the following:   Arnold Ennis MD, have reviewed the History, Physical and updated the Allergic reactions for [place] performed immediately prior to start of procedure:   Arnold Ennis MD, have performed the following reviews on [place] Jil. prior to the start of the procedure:            * Patient was identified by name and date of birth   * Agreement on procedure being performed was verified  * Risks and Benefits explained to the patient  * Procedure site verified and marked as necessary  * Patient was positioned for comfort  * Consent was signed and verified     Time:         Date of procedure: 4/19/2018    Procedure performed by:  John Robin MD    Provider assisted by:    LPN    Patient assisted by: self    How tolerated by patient: tolerated the procedure well with no complications    Post Procedural Pain Scale: 0 - No Hurt    Comments: none

## 2018-06-25 ENCOUNTER — OFFICE VISIT (OUTPATIENT)
Dept: DERMATOLOGY | Facility: AMBULATORY SURGERY CENTER | Age: 63
End: 2018-06-25

## 2018-06-25 VITALS
HEART RATE: 72 BPM | OXYGEN SATURATION: 98 % | SYSTOLIC BLOOD PRESSURE: 104 MMHG | TEMPERATURE: 98.2 F | DIASTOLIC BLOOD PRESSURE: 64 MMHG | RESPIRATION RATE: 16 BRPM

## 2018-06-25 DIAGNOSIS — C44.91: Primary | ICD-10-CM

## 2018-06-25 NOTE — PROGRESS NOTES
This note is written by Martinez Nava, as dictated by Gypsy Anne. Paola Whitney MD.    CC: Recurrent basal cell carcinoma on the left upper back     History of present illness:     Lauren Reno is a 59 y.o. male and established patient. He has a biopsy-proven basal cell carcinoma, superficial and multifocal with focal ulceration, on the left upper back. This is a recurrent basal cell carcinoma present for an unstated amount of time described as a lesion that itches with prior treatment of curettage years ago. Biopsy by me confirmed the diagnosis of basal cell carcinoma, and I reviewed the written pathology report. He is feeling well and in his usual state of health today. He has no pain, no current illnesses, no other skin concerns. His allergies, medications, medical, and social history are reviewed by me today. Exam:     He is an awake, alert, and oriented 59 y.o. male who appears well and in no distress. There is no preauricular, submandibular, or cervical lymphadenopathy. I examined his left upper back. He has a flat white scar on his left upper back. At the superior point of this scar, he has a 19 x 10 mm pink indistinct plaque. He confirms location. Assessment/plan:    1. Recurrent basal cell carcinoma, left upper back. I discussed the diagnosis of basal cell carcinoma and summarized the pathology report. Mohs surgery is indicated by size, poor definition, recurrence, and histology. The procedure was discussed, verbal and written consent were obtained. I performed the procedure. One stage was required to reach a tumor free plane. The surgical defect was managed with intermediate repair. There were no complications. He will follow up as needed as the site heals. Indications, risks, and options were discussed with Lauren Reno preoperatively.  Risks including, but not limited to: pain, bleeding, infection, tumor recurrence, scarring and damage to motor and/or sensory nerves, were discussed. Atilio Martines chose Mohs surgery. Atilio Martines was an acceptable surgery candidate. Atilio Martines was placed in the appropriate position on the operating table in the Mohs surgery procedure room. The area was prepped and draped in the standard manner. Gentian violet was used to outline the clinical margins of the tumor. Local anesthesia was then obtained. The grossly visible tumor was then removed, an underlying layer was excised and mapped according to the Mohs technique, and the individual specimens examined microscopically. The process was repeated until microscopic examination of the tissue specimens confirmed a tumor-free plane. Hemostasis was obtained with electrosurgery and pressure. The wound was covered between stages with moist saline gauze. The wound management options of second intent healing, layered closure, local flap, and/or full thickness skin graft were discussed. Atilio Martines understands the aims, risks, alternatives, and possible complications and elects to proceed with an intermediate layered closure. Wound margins were made vertical, edges undermined in the subcutaneous plane, standing cones corrected at both poles followed by layered closure. The wound was closed with buried 4-0 polysorb suture in the subcutis to reduce tension on the skin edges, and skin edges were approximated with 4-0 polysorb suture in the dermis to reduce tension on the epidermis. The final closure length was 40 mm. The wound was bandaged with steristrips, Telfa, gauze and Coverroll. Wound care instructions (written and verbal) and a follow up appointment were given to Atilio Martines before discharge. Atilio Martines was discharged in good condition. 2. History of nonmelanoma skin cancer. I discussed the diagnosis and recommend routine examinations with me for surveillance.     The documentation recorded by the scribe accurately reflects the service I personally performed and the decisions made by me. K[facility]   OFFICE PROCEDURE PROGRESS NOTE     Chart reviewed for the following:     Laney Barrett MD, have reviewed the History, Physical and updated the Allergic reactions for [address] performed immediately prior to start of procedure:     Laney Moscoso. Tonja Barrett MD, have performed the following reviews on Patricia Segovia prior to the start of the procedure:     * Patient was identified by name and date of birth   * Agreement on procedure being performed was verified   * Risks and Benefits explained to the patient   * Procedure site verified and marked as necessary   * Patient was positioned for comfort   * Consent was signed and verified     Time: 1:15 PM   Date of procedure: 6/25/2018  Procedure performed by: Coleen Colby.  Tonja Barrett MD   Provider assisted by: LPN   Patient assisted by: self   How tolerated by patient: tolerated the procedure well with no complications   Comments: none

## 2018-06-25 NOTE — MR AVS SNAPSHOT
[address] [facility] [address] [place] [address] [address] [place] [phone] Patient: Martha Kiran MRN: [id number] WPC:[id number] Visit Information Date & Time Provider Department Dept. Phone Encounter #  
 6/25/2018  1:00 PM MD Kira Landaverde [phone] Your Appointments 10/19/2018  8:30 AM  
Office Visit with MELLISSA Billy57 [address] [place] [address]) Appt Note: est. pt. 6 month skin exam  
 [facility] [address] [facility] [address]  
[address] [facility] [address] [facility] [address] Upcoming Health Maintenance Date Due Hepatitis C Screening 1955 DTaP/Tdap/Td series (1 - Tdap) 6/24/1977 ZOSTER VACCINE AGE 60> 4/24/2016 Influenza Age 5 to Adult 8/1/2018 COLONOSCOPY 11/9/2027 Allergies as of 6/25/2018  Review Complete On: 6/25/2018 By: Chelsea Hartman LPN No Known Allergies Current Immunizations  Reviewed on 12/15/2014 Name Date Influenza Vaccine 12/15/2014, 11/14/2013  9:11 AM  
 Influenza Vaccine Split 10/21/2011 Not reviewed this visit You Were Diagnosed With   
  
 Codes Comments Recurrent basal cell carcinoma following curettage    -  Primary ICD-10-CM: C44.91 
ICD-9-CM: 173.91 Vitals BP Pulse Temp Resp SpO2 Smoking Status 104/64 72 98.2 °F (36.8 °C) 16 98% Never Assessed Preferred Pharmacy Pharmacy Name Phone [address], [address] [facility] [phone] Your Updated Medication List  
  
   
This list is accurate as of 6/25/18  1:23 PM.  Always use your most recent med list.  
  
  
  
  
 * aspirin delayed-release 81 mg tablet Take  by mouth daily. * aspirin 81 mg chewable tablet Take 1 Tab by mouth daily. * atorvastatin 40 mg tablet Commonly known as:  LIPITOR Take  by mouth daily. * atorvastatin 40 mg tablet Commonly known as:  LIPITOR  
TAKE 1 TABLET BY MOUTH DAILY. * lisinopril-hydroCHLOROthiazide 20-12.5 mg per tablet Commonly known as:  Gurmeet Pelt Take  by mouth daily. * lisinopril-hydroCHLOROthiazide 20-12.5 mg per tablet Commonly known as:  PRINZIDE, ZESTORETIC  
TAKE 1 TABLET BY MOUTH DAILY. * lisinopril-hydroCHLOROthiazide 20-12.5 mg per tablet Commonly known as:  PRINZIDE, ZESTORETIC  
TAKE 1 TABLET BY MOUTH DAILY. * sertraline 100 mg tablet Commonly known as:  ZOLOFT Take  by mouth daily. * sertraline 100 mg tablet Commonly known as:  ZOLOFT  
TAKE 1 AND 1/2 TABLETS BY MOUTH EVERY MORNING. * Notice: This list has 9 medication(s) that are the same as other medications prescribed for you. Read the directions carefully, and ask your doctor or other care provider to review them with you. Patient Instructions WOUND CARE INSTRUCTIONS 1. Keep the dressing clean and dry and do not remove for 48 hours. 2. Then change the dressing once a day as follows: 
a. Wash hands before and after each dressing change. b. Remove dressing and wash site gently with mild soap and water, rinse, and pat dry. 
c. Apply an ointment (Bacitracin, Polysporin, Neosporin, Petroleum jelly or Aquaphor). d. Apply a non-stick (Telfa) dressing or Band-Aid to cover the wound. Remove pressure bandage on wedensday, then wash site gently. Leave steri-strips in place until it naturally begins to peel off, about 4-6 days, then remove. After 7 days, if steri-strips remain, they may be removed. Vaseline may be applied at this point for one week. 3. Watch for: BLEEDING: A small amount of drainage may occur. If bleeding occurs, elevate and rest the surgery site. Apply gauze and steady pressure for 15 minutes. If bleeding continues, call this office. INFECTION: Signs of infection include increased redness, pain, warmth, drainage of pus, and fever. If this occurs, call this office. 4. Special Instructions (follow any that are checked): 
· [x] You have stitches that DO NOT need to be removed. · [x] Avoid bending at the waist and heavy lifting for two days. · [x] Sleep with your head elevated for the next two nights. · [x] Rest the surgery site and keep it elevated as much as possible for two days. · [] You may apply an ice-pack for 10-15 minutes every waking hour for the rest of the day. · [] Eat a soft diet and avoid hot food and hot drinks for the rest of the day. · [] Other instructions: Follow up as directed. Take Tylenol or Ibuprofen for pain as needed. Once the site is healed with no remaining bandages or open areas, protect your surgical site and scar from the sun, as this area will be more sensitive. Use a broad spectrum sunscreen SPF 30 or higher daily, and a chemical free product (one containing zinc oxide or titanium dioxide) is a good choice if the area is sensitive. You may begin to gently massage the surgical site in 2-3 weeks, rubbing in a circular motion along the scar. This can help reduce swelling and thickness of a scar. A scar cream may be used beginnning 1 month after the surgery. If you have any questions or concerns, please call our office Monday through Friday at [phone]. Introducing [facility]! Dear Uma Echeverria: 
Thank you for requesting a [employer] account. Our records indicate that you already have an active [employer] account. You can access your account anytime at [email or web address] Did you know that you can access your hospital and ER discharge instructions at any time in [employer]? You can also review all of your test results from your hospital stay or ER visit. Additional Information If you have questions, please visit the Frequently Asked Questions section of the [employer] website at [email or web address] Remember, [employer] is NOT to be used for urgent needs. For medical emergencies, dial 911. Now available from your iPhone and Android! Please provide this summary of care documentation to your next provider. Your primary care clinician is listed as LUDWIG Olmstead. If you have any questions after today's visit, please call [phone].

## 2018-06-25 NOTE — PATIENT INSTRUCTIONS
WOUND CARE INSTRUCTIONS    1. Keep the dressing clean and dry and do not remove for 48 hours. 2. Then change the dressing once a day as follows:  a. Wash hands before and after each dressing change. b. Remove dressing and wash site gently with mild soap and water, rinse, and pat dry.  c. Apply an ointment (Bacitracin, Polysporin, Neosporin, Petroleum jelly or Aquaphor). d. Apply a non-stick (Telfa) dressing or Band-Aid to cover the wound. Remove pressure bandage on wedensday, then wash site gently. Leave steri-strips in place until it naturally begins to peel off, about 4-6 days, then remove. After 7 days, if steri-strips remain, they may be removed. Vaseline may be applied at this point for one week. 3. Watch for:  BLEEDING: A small amount of drainage may occur. If bleeding occurs, elevate and rest the surgery site. Apply gauze and steady pressure for 15 minutes. If bleeding continues, call this office. INFECTION: Signs of infection include increased redness, pain, warmth, drainage of pus, and fever. If this occurs, call this office. 4. Special Instructions (follow any that are checked):  · [x] You have stitches that DO NOT need to be removed. · [x] Avoid bending at the waist and heavy lifting for two days. · [x] Sleep with your head elevated for the next two nights. · [x] Rest the surgery site and keep it elevated as much as possible for two days. · [] You may apply an ice-pack for 10-15 minutes every waking hour for the rest of the day. · [] Eat a soft diet and avoid hot food and hot drinks for the rest of the day. · [] Other instructions: Follow up as directed. Take Tylenol or Ibuprofen for pain as needed. Once the site is healed with no remaining bandages or open areas, protect your surgical site and scar from the sun, as this area will be more sensitive.   Use a broad spectrum sunscreen SPF 30 or higher daily, and a chemical free product (one containing zinc oxide or titanium dioxide) is a good choice if the area is sensitive. You may begin to gently massage the surgical site in 2-3 weeks, rubbing in a circular motion along the scar. This can help reduce swelling and thickness of a scar. A scar cream may be used beginnning 1 month after the surgery. If you have any questions or concerns, please call our office Monday through Friday at [phone].

## 2018-11-26 PROBLEM — F32.A MILD DEPRESSION: Status: ACTIVE | Noted: 2018-11-26

## 2020-06-10 PROBLEM — F41.9 ANXIETY: Chronic | Status: ACTIVE | Noted: 2020-06-10
